# Patient Record
Sex: FEMALE | Race: ASIAN | NOT HISPANIC OR LATINO | URBAN - METROPOLITAN AREA
[De-identification: names, ages, dates, MRNs, and addresses within clinical notes are randomized per-mention and may not be internally consistent; named-entity substitution may affect disease eponyms.]

---

## 2017-10-13 ENCOUNTER — COMMUNICATION - HEALTHEAST (OUTPATIENT)
Dept: TELEHEALTH | Facility: CLINIC | Age: 22
End: 2017-10-13

## 2017-10-13 ENCOUNTER — OFFICE VISIT - HEALTHEAST (OUTPATIENT)
Dept: FAMILY MEDICINE | Facility: CLINIC | Age: 22
End: 2017-10-13

## 2017-10-13 DIAGNOSIS — N94.6 DYSMENORRHEA: ICD-10-CM

## 2017-10-13 DIAGNOSIS — Z23 NEED FOR VACCINATION: ICD-10-CM

## 2017-10-13 DIAGNOSIS — Z00.00 ROUTINE GENERAL MEDICAL EXAMINATION AT A HEALTH CARE FACILITY: ICD-10-CM

## 2017-10-13 ASSESSMENT — MIFFLIN-ST. JEOR: SCORE: 1324.01

## 2021-05-31 VITALS — BODY MASS INDEX: 23.74 KG/M2 | WEIGHT: 134 LBS | HEIGHT: 63 IN

## 2021-06-13 NOTE — PROGRESS NOTES
[facility] Child Check    ASSESSMENT & PLAN  Rashi Garcia is a 22 y.o. who has normal growth and normal development.    Diagnoses and all orders for this visit:    Routine general medical examination at a health care facility    Need for vaccination  -     Influenza, Seasonal Quad, Preservative Free 36+ Months  -     HPV vaccine 9 valent 3 dose IM    Dysmenorrhea -patient reassured about symptoms.  No evidence of any significant pathology.  Given that ibuprofen or Aleve have adequately relieved her symptoms, I recommended she continue these, starting about a day prior to expecting her menses and continuing through the first day of her period.  She should follow-up if symptoms not adequately controlled and we would consider oral contraceptives.    IMMUNIZATIONS/LABS   Immunizations were reviewed and orders were placed as appropriate.    REFERRALS  Dental:  The patient has already established care with a dentist.  Other:  No additional referrals were made at this time.    ANTICIPATORY GUIDANCE  I have reviewed age appropriate anticipatory guidance.    HEALTH HISTORY  Do you have any concerns that you'd like to discuss today?: She is having pain with her menses.  She has a period once per month.  She has pain the first day, then it improves.  Her periods last 5-6 days.  She reports her bleeding is normal.  She has taken Advil or Aleve which was helpful and adequately improved her symptoms.       Roomed by:  Pearl   Accompanied by  Cousin   Refills needed?  No       Do you have any significant health concerns in your family history?: No  No family history on file.  Since your last visit, have there been any major changes in your family, such as a move, job change, separation, divorce, or death in the family?: No    Home  Who lives in your home?:  Cousins (2), Aunts (2), and 1 uncle.  Social History     Social History Narrative     No narrative on file     Do you have any trouble with sleep?:   "No    Education  What school does your child attend?:  [employer]  What grade is your child in?:  12th  How does the patient perform in school (grades, behavior, attention, homework?: Good     Eating  Does patient eat regular meals including fruits and vegetables?:  yes  What is the patient drinking (cow's milk, water, soda, juice, sports drinks, energy drinks, etc)?: water  Does patient have concerns about body or appearance?:  No    Activities  Does the patient have friends?:  yes  Does the patient get at least one hour of physical activity per day?:  no  Does the patient have less than 2 hours of screen time per day (aside from homework)?:  yes  What does your child do for exercise?:  Yoga  Does the patient have interest/participate in community activities/volunteers/school sports?:  no    MENTAL HEALTH SCREENING  PHQ-2 Total Score: 0 (10/13/2017  3:52 PM)  No Data Recorded    TB Risk Assessment:  The patient and/or parent/guardian answer positive to:  patient and/or parent/guardian answer 'no' to all screening TB questions    Dental  Is your child being seen by a dentist?  No  Flouride Varnish Application Screening  Is child seen by dentist?     No  Fluoride Varnish Application risks and benefits discussed and verbal consent was received.    There is no problem list on file for this patient.      Drugs  Does the patient use tobacco/alcohol/drugs?:  no    Safety  Does the patient have any safety concerns (peer or home)?:  no  Does the patient use safety belts, helmets and other safety equipment?:  yes    Sex  Is the patient sexually active?:  no    MEASUREMENTS  Height:  5' 2.5\" (1.588 m)  Weight: 134 lb (60.8 kg)  BMI: Body mass index is 24.12 kg/(m^2).  Blood Pressure: 110/64  Growth percentile SmartLinks can only be used for patients less than 20 years old.    PHYSICAL EXAM  Physical Exam   Constitutional: She is oriented to person, place, and time. She appears well-developed and well-nourished. No " distress.   HENT:   Right Ear: Tympanic membrane and ear canal normal.   Left Ear: Tympanic membrane and ear canal normal.   Mouth/Throat: Oropharynx is clear and moist and mucous membranes are normal.   Eyes: Conjunctivae are normal.   Neck: Normal range of motion. Neck supple. No thyromegaly present.   Cardiovascular: Normal rate and regular rhythm.    No murmur heard.  Pulmonary/Chest: Effort normal and breath sounds normal. No respiratory distress. She has no wheezes. She has no rales.   Abdominal: Soft. Normal appearance and bowel sounds are normal. She exhibits no mass. There is no hepatosplenomegaly. There is no tenderness. There is no rigidity and no guarding. Hernia confirmed negative in the ventral area.   Musculoskeletal: She exhibits no edema.   Lymphadenopathy:     She has no cervical adenopathy.   Neurological: She is alert and oriented to person, place, and time. No cranial nerve deficit.   Skin: No rash noted.   Psychiatric: She has a normal mood and affect.